# Patient Record
Sex: MALE | Race: WHITE
[De-identification: names, ages, dates, MRNs, and addresses within clinical notes are randomized per-mention and may not be internally consistent; named-entity substitution may affect disease eponyms.]

---

## 2017-08-30 ENCOUNTER — HOSPITAL ENCOUNTER (EMERGENCY)
Dept: HOSPITAL 47 - EC | Age: 32
Discharge: HOME | End: 2017-08-30
Payer: COMMERCIAL

## 2017-08-30 VITALS
TEMPERATURE: 96.9 F | HEART RATE: 66 BPM | DIASTOLIC BLOOD PRESSURE: 69 MMHG | SYSTOLIC BLOOD PRESSURE: 119 MMHG | RESPIRATION RATE: 20 BRPM

## 2017-08-30 DIAGNOSIS — R50.9: ICD-10-CM

## 2017-08-30 DIAGNOSIS — K27.9: Primary | ICD-10-CM

## 2017-08-30 DIAGNOSIS — F17.200: ICD-10-CM

## 2017-08-30 LAB
ALP SERPL-CCNC: 58 U/L (ref 38–126)
ALT SERPL-CCNC: 30 U/L (ref 21–72)
AMYLASE SERPL-CCNC: 59 U/L (ref 30–110)
ANION GAP SERPL CALC-SCNC: 8 MMOL/L
AST SERPL-CCNC: 20 U/L (ref 17–59)
BASOPHILS # BLD AUTO: 0.1 K/UL (ref 0–0.2)
BASOPHILS NFR BLD AUTO: 1 %
BUN SERPL-SCNC: 10 MG/DL (ref 9–20)
CALCIUM SPEC-MCNC: 9.3 MG/DL (ref 8.4–10.2)
CH: 30
CHCM: 32.8
CHLORIDE SERPL-SCNC: 109 MMOL/L (ref 98–107)
CO2 SERPL-SCNC: 26 MMOL/L (ref 22–30)
EOSINOPHIL # BLD AUTO: 0.3 K/UL (ref 0–0.7)
EOSINOPHIL NFR BLD AUTO: 4 %
ERYTHROCYTE [DISTWIDTH] IN BLOOD BY AUTOMATED COUNT: 5.2 M/UL (ref 4.3–5.9)
ERYTHROCYTE [DISTWIDTH] IN BLOOD: 13.1 % (ref 11.5–15.5)
GLUCOSE SERPL-MCNC: 87 MG/DL (ref 74–99)
HCT VFR BLD AUTO: 47.9 % (ref 39–53)
HDW: 2.41
HGB BLD-MCNC: 16.2 GM/DL (ref 13–17.5)
LUC NFR BLD AUTO: 3 %
LYMPHOCYTES # SPEC AUTO: 2.3 K/UL (ref 1–4.8)
LYMPHOCYTES NFR SPEC AUTO: 30 %
MCH RBC QN AUTO: 31.2 PG (ref 25–35)
MCHC RBC AUTO-ENTMCNC: 33.9 G/DL (ref 31–37)
MCV RBC AUTO: 92.1 FL (ref 80–100)
MONOCYTES # BLD AUTO: 0.3 K/UL (ref 0–1)
MONOCYTES NFR BLD AUTO: 4 %
NEUTROPHILS # BLD AUTO: 4.6 K/UL (ref 1.3–7.7)
NEUTROPHILS NFR BLD AUTO: 59 %
NON-AFRICAN AMERICAN GFR(MDRD): >60
PH UR: 7 [PH] (ref 5–8)
POTASSIUM SERPL-SCNC: 4.8 MMOL/L (ref 3.5–5.1)
PROT SERPL-MCNC: 7.2 G/DL (ref 6.3–8.2)
SODIUM SERPL-SCNC: 143 MMOL/L (ref 137–145)
SP GR UR: 1.01 (ref 1–1.03)
UA BILLING (MACRO VS. MICRO): (no result)
UROBILINOGEN UR QL STRIP: <2 MG/DL (ref ?–2)
WBC # BLD AUTO: 0.2 10*3/UL
WBC # BLD AUTO: 7.8 K/UL (ref 3.8–10.6)
WBC (PEROX): 7.94

## 2017-08-30 PROCEDURE — 99284 EMERGENCY DEPT VISIT MOD MDM: CPT

## 2017-08-30 PROCEDURE — 96374 THER/PROPH/DIAG INJ IV PUSH: CPT

## 2017-08-30 PROCEDURE — 80053 COMPREHEN METABOLIC PANEL: CPT

## 2017-08-30 PROCEDURE — 83690 ASSAY OF LIPASE: CPT

## 2017-08-30 PROCEDURE — 82150 ASSAY OF AMYLASE: CPT

## 2017-08-30 PROCEDURE — 83605 ASSAY OF LACTIC ACID: CPT

## 2017-08-30 PROCEDURE — 96375 TX/PRO/DX INJ NEW DRUG ADDON: CPT

## 2017-08-30 PROCEDURE — 86850 RBC ANTIBODY SCREEN: CPT

## 2017-08-30 PROCEDURE — 81003 URINALYSIS AUTO W/O SCOPE: CPT

## 2017-08-30 PROCEDURE — 96361 HYDRATE IV INFUSION ADD-ON: CPT

## 2017-08-30 PROCEDURE — 85025 COMPLETE CBC W/AUTO DIFF WBC: CPT

## 2017-08-30 PROCEDURE — 74177 CT ABD & PELVIS W/CONTRAST: CPT

## 2017-08-30 PROCEDURE — 86901 BLOOD TYPING SEROLOGIC RH(D): CPT

## 2017-08-30 PROCEDURE — 36415 COLL VENOUS BLD VENIPUNCTURE: CPT

## 2017-08-30 PROCEDURE — 86900 BLOOD TYPING SEROLOGIC ABO: CPT

## 2017-08-30 PROCEDURE — 87086 URINE CULTURE/COLONY COUNT: CPT

## 2017-08-30 NOTE — CT
EXAMINATION TYPE: CT abdomen pelvis w con

 

DATE OF EXAM: 8/30/2017

 

COMPARISON: NONE

 

HISTORY: abdominal pain, dark blood in stools X 2 weeks. History of gastritis.

 

CT DLP: 371.2 mGycm, Automated Exposure Control for Dose Reduction was Utilized.

 

CONTRAST: 

CT scan of the abdomen and pelvis is performed without oral but with IV Contrast, patient injected wi
th 100 mL of Omnipaque 300.

 

FINDINGS:

 

LUNG BASES:  No significant abnormality is appreciated.

 

LIVER/GB: In the inferior medial aspect lateral segment left hepatic lobe there is 3.2 x 2.5 cm mass 
on axial image 24 that has hyperdense on initial images and isointense to remainder of liver on delay
ed phased images.

 

PANCREAS:  No significant abnormality is seen.

 

SPLEEN:  No significant abnormality is seen.

 

ADRENALS:  No significant abnormality is seen.

 

KIDNEYS: There is cortical fusion of the lower pole modalities of both kidneys 4 horseshoe type kidne
y appearance. There is subcentimeter simple appearing cyst lower pole level right kidney on image 49 
series 5. Few scattered pelvic phleboliths are seen.

 

BOWEL: Evaluation bowel is suboptimal secondary to lack of enteric contrast. Patient also has very li
ttle abdominal fat making evaluation suboptimal. There is no suspicious dilatation of stomach. Duoden
al sweep is poorly visualized but not dilated. There is no suspicious small or large bowel dilatation
. Appendix is difficult to distinctly visualized but likely within normal limits portions seen best o
n axial image 58 and coronal image 26.

 

PROSTATE/SEMINAL VESICLES:  No gross abnormality seen.

 

LYMPH NODES:  No greater than 1cm abdominal or pelvic lymph nodes are appreciated.

 

OSSEOUS STRUCTURES:  No significant abnormality is seen.

 

OTHER:  No significant additional abnormality is seen.

 

IMPRESSION: 

1. Suboptimal study. No bowel obstruction is seen. No significant finding is seen to account for heather
ent's acute symptoms.

2. Nonspecific 3.2 cm left lobe liver mass in which neoplasm cannot be excluded, suspect FNH or hepat
ic adenoma as most likely etiology. Further investigation with nonemergent multiphasic contrast-enhan
alfie liver protocol MRI is advised.

3. 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

## 2017-08-30 NOTE — ED
General Adult HPI





- General


Chief complaint: Abdominal Pain


Stated complaint: Abd Pain


Time Seen by Provider: 08/30/17 12:38


Source: patient, RN notes reviewed, old records reviewed


Mode of arrival: ambulatory


Limitations: no limitations





- History of Present Illness


Initial comments: 





This is a 32-year-old male to the ER for evaluation.  Patient coming in here 

today fevers of severe abdominal pain.  Periumbilical bowel pain with radiation 

to his left side.  Going on for a month no help with Motrin or Tylenol.  No 

fevers.  He does think there is having some black or dark stools.  No nausea 

and vomiting.  He also states he thinks he sees blood in his stools.  No 

surgical history denies drugs or alcohol





- Related Data


 Previous Rx's











 Medication  Instructions  Recorded


 


Amoxicillin 500 mg PO Q8H #21 capsule 08/30/17


 


Clarithromycin [Biaxin] 500 mg PO Q12HR #14 tablet 08/30/17


 


Famotidine [Pepcid] 20 mg PO BID #30 tablet 08/30/17


 


HYDROcodone/APAP 5-325MG [Norco 1 tab PO Q6HR PRN #20 tab 08/30/17





5-325]  


 


Pantoprazole Sodium [Protonix] 20 mg PO DAILY #30 tablet. 08/30/17











 Allergies











Allergy/AdvReac Type Severity Reaction Status Date / Time


 


No Known Allergies Allergy   Verified 08/30/17 13:38














Review of Systems


ROS Statement: 


Those systems with pertinent positive or pertinent negative responses have been 

documented in the HPI.





ROS Other: All systems not noted in ROS Statement are negative.





Past Medical History


Past Medical History: No Reported History


Additional Past Medical History / Comment(s): Gastritis


History of Any Multi-Drug Resistant Organisms: None Reported


Past Surgical History: No Surgical Hx Reported


Past Psychological History: ADD/ADHD


Smoking Status: Current every day smoker


Past Alcohol Use History: Occasional


Past Drug Use History: Marijuana





General Exam


Limitations: no limitations


General appearance: alert, in no apparent distress


Head exam: Present: atraumatic, normocephalic, normal inspection


Eye exam: Present: normal appearance, PERRL, EOMI.  Absent: scleral icterus, 

conjunctival injection, periorbital swelling


ENT exam: Present: normal exam, mucous membranes moist


Neck exam: Present: normal inspection.  Absent: tenderness, meningismus, 

lymphadenopathy


Respiratory exam: Present: normal lung sounds bilaterally.  Absent: respiratory 

distress, wheezes, rales, rhonchi, stridor


Cardiovascular Exam: Present: regular rate, normal rhythm, normal heart sounds.

  Absent: systolic murmur, diastolic murmur, rubs, gallop, clicks


GI/Abdominal exam: Present: soft, normal bowel sounds.  Absent: distended, 

tenderness, guarding, rebound, rigid


Extremities exam: Present: normal inspection, full ROM, normal capillary 

refill.  Absent: tenderness, pedal edema, joint swelling, calf tenderness


Back exam: Present: normal inspection


Neurological exam: Present: alert, oriented X3, CN II-XII intact


Psychiatric exam: Present: normal affect, normal mood


Skin exam: Present: warm, dry, intact, normal color.  Absent: rash





Course


 Vital Signs











  08/30/17





  12:33


 


Temperature 98.2 F


 


Pulse Rate 78


 


Respiratory 18





Rate 


 


Blood Pressure 141/87


 


O2 Sat by Pulse 100





Oximetry 














- Reevaluation(s)


Reevaluation #1: 





08/30/17 15:03


pain is improved





Medical Decision Making





- Medical Decision Making





30 male to the ER for evaluation of jaw pain, peptic ulcer disease, patient 

likely has also, CT is negative lab work is normal patient can be discharged 

home





- Lab Data


Result diagrams: 


 08/30/17 12:56





 08/30/17 12:56


 Lab Results











  08/30/17 08/30/17 08/30/17 Range/Units





  12:56 12:56 12:56 


 


WBC    7.8  (3.8-10.6)  k/uL


 


RBC    5.20  (4.30-5.90)  m/uL


 


Hgb    16.2  (13.0-17.5)  gm/dL


 


Hct    47.9  (39.0-53.0)  %


 


MCV    92.1  (80.0-100.0)  fL


 


MCH    31.2  (25.0-35.0)  pg


 


MCHC    33.9  (31.0-37.0)  g/dL


 


RDW    13.1  (11.5-15.5)  %


 


Plt Count    279  (150-450)  k/uL


 


Neutrophils %    59  %


 


Lymphocytes %    30  %


 


Monocytes %    4  %


 


Eosinophils %    4  %


 


Basophils %    1  %


 


Neutrophils #    4.6  (1.3-7.7)  k/uL


 


Lymphocytes #    2.3  (1.0-4.8)  k/uL


 


Monocytes #    0.3  (0-1.0)  k/uL


 


Eosinophils #    0.3  (0-0.7)  k/uL


 


Basophils #    0.1  (0-0.2)  k/uL


 


Sodium   143   (137-145)  mmol/L


 


Potassium   4.8   (3.5-5.1)  mmol/L


 


Chloride   109 H   ()  mmol/L


 


Carbon Dioxide   26   (22-30)  mmol/L


 


Anion Gap   8   mmol/L


 


BUN   10   (9-20)  mg/dL


 


Creatinine   0.77   (0.66-1.25)  mg/dL


 


Est GFR (MDRD) Af Amer   >60   (>60 ml/min/1.73 sqM)  


 


Est GFR (MDRD) Non-Af   >60   (>60 ml/min/1.73 sqM)  


 


Glucose   87   (74-99)  mg/dL


 


Plasma Lactic Acid Eduardo     (0.7-2.0)  mmol/L


 


Calcium   9.3   (8.4-10.2)  mg/dL


 


Total Bilirubin   0.4   (0.2-1.3)  mg/dL


 


AST   20   (17-59)  U/L


 


ALT   30   (21-72)  U/L


 


Alkaline Phosphatase   58   ()  U/L


 


Total Protein   7.2   (6.3-8.2)  g/dL


 


Albumin   4.5   (3.5-5.0)  g/dL


 


Amylase   59   ()  U/L


 


Lipase   56   ()  U/L


 


Urine Color     


 


Urine Appearance     (Clear)  


 


Urine pH     (5.0-8.0)  


 


Ur Specific Gravity     (1.001-1.035)  


 


Urine Protein     (Negative)  


 


Urine Glucose (UA)     (Negative)  


 


Urine Ketones     (Negative)  


 


Urine Blood     (Negative)  


 


Urine Nitrite     (Negative)  


 


Urine Bilirubin     (Negative)  


 


Urine Urobilinogen     (<2.0)  mg/dL


 


Ur Leukocyte Esterase     (Negative)  


 


Blood Type  A Positive    


 


Blood Type Recheck  CABO Indicated    


 


Antibody Screen  NEGATIVE    


 


Spec Expiration Date  09/02/2017 - 2356 08/30/17 08/30/17 Range/Units





  12:56 12:56 


 


WBC    (3.8-10.6)  k/uL


 


RBC    (4.30-5.90)  m/uL


 


Hgb    (13.0-17.5)  gm/dL


 


Hct    (39.0-53.0)  %


 


MCV    (80.0-100.0)  fL


 


MCH    (25.0-35.0)  pg


 


MCHC    (31.0-37.0)  g/dL


 


RDW    (11.5-15.5)  %


 


Plt Count    (150-450)  k/uL


 


Neutrophils %    %


 


Lymphocytes %    %


 


Monocytes %    %


 


Eosinophils %    %


 


Basophils %    %


 


Neutrophils #    (1.3-7.7)  k/uL


 


Lymphocytes #    (1.0-4.8)  k/uL


 


Monocytes #    (0-1.0)  k/uL


 


Eosinophils #    (0-0.7)  k/uL


 


Basophils #    (0-0.2)  k/uL


 


Sodium    (137-145)  mmol/L


 


Potassium    (3.5-5.1)  mmol/L


 


Chloride    ()  mmol/L


 


Carbon Dioxide    (22-30)  mmol/L


 


Anion Gap    mmol/L


 


BUN    (9-20)  mg/dL


 


Creatinine    (0.66-1.25)  mg/dL


 


Est GFR (MDRD) Af Amer    (>60 ml/min/1.73 sqM)  


 


Est GFR (MDRD) Non-Af    (>60 ml/min/1.73 sqM)  


 


Glucose    (74-99)  mg/dL


 


Plasma Lactic Acid Eduardo  0.9   (0.7-2.0)  mmol/L


 


Calcium    (8.4-10.2)  mg/dL


 


Total Bilirubin    (0.2-1.3)  mg/dL


 


AST    (17-59)  U/L


 


ALT    (21-72)  U/L


 


Alkaline Phosphatase    ()  U/L


 


Total Protein    (6.3-8.2)  g/dL


 


Albumin    (3.5-5.0)  g/dL


 


Amylase    ()  U/L


 


Lipase    ()  U/L


 


Urine Color   Light Yellow  


 


Urine Appearance   Clear  (Clear)  


 


Urine pH   7.0  (5.0-8.0)  


 


Ur Specific Gravity   1.012  (1.001-1.035)  


 


Urine Protein   Negative  (Negative)  


 


Urine Glucose (UA)   Negative  (Negative)  


 


Urine Ketones   Negative  (Negative)  


 


Urine Blood   Negative  (Negative)  


 


Urine Nitrite   Negative  (Negative)  


 


Urine Bilirubin   Negative  (Negative)  


 


Urine Urobilinogen   <2.0  (<2.0)  mg/dL


 


Ur Leukocyte Esterase   Negative  (Negative)  


 


Blood Type    


 


Blood Type Recheck    


 


Antibody Screen    


 


Spec Expiration Date    














- Radiology Data


Radiology results: report reviewed (CT pelvis is negative for acute disease), 

image reviewed





Disposition


Clinical Impression: 


 Abdominal pain, Peptic ulcer





Disposition: HOME SELF-CARE


Condition: Good


Instructions:  Peptic Ulcer (ED), Gastritis (ED)


Prescriptions: 


Amoxicillin 500 mg PO Q8H #21 capsule


Clarithromycin [Biaxin] 500 mg PO Q12HR #14 tablet


Famotidine [Pepcid] 20 mg PO BID #30 tablet


HYDROcodone/APAP 5-325MG [Norco 5-325] 1 tab PO Q6HR PRN #20 tab


 PRN Reason: Pain


Pantoprazole Sodium [Protonix] 20 mg PO DAILY #30 tablet.


Referrals: 


None,Stated [Primary Care Provider] - 1-2 days

## 2017-09-06 ENCOUNTER — HOSPITAL ENCOUNTER (EMERGENCY)
Dept: HOSPITAL 47 - EC | Age: 32
LOS: 1 days | Discharge: HOME | End: 2017-09-07
Payer: COMMERCIAL

## 2017-09-06 VITALS — RESPIRATION RATE: 16 BRPM

## 2017-09-06 DIAGNOSIS — R10.13: Primary | ICD-10-CM

## 2017-09-06 DIAGNOSIS — F17.200: ICD-10-CM

## 2017-09-06 DIAGNOSIS — R11.2: ICD-10-CM

## 2017-09-06 LAB
ALP SERPL-CCNC: 57 U/L (ref 38–126)
ALT SERPL-CCNC: 33 U/L (ref 21–72)
AMYLASE SERPL-CCNC: 54 U/L (ref 30–110)
ANION GAP SERPL CALC-SCNC: 11 MMOL/L
AST SERPL-CCNC: 25 U/L (ref 17–59)
BASOPHILS # BLD AUTO: 0.1 K/UL (ref 0–0.2)
BASOPHILS NFR BLD AUTO: 1 %
BUN SERPL-SCNC: 17 MG/DL (ref 9–20)
CALCIUM SPEC-MCNC: 10 MG/DL (ref 8.4–10.2)
CH: 31.9
CHCM: 34.6
CHLORIDE SERPL-SCNC: 106 MMOL/L (ref 98–107)
CO2 SERPL-SCNC: 24 MMOL/L (ref 22–30)
EOSINOPHIL # BLD AUTO: 0.3 K/UL (ref 0–0.7)
EOSINOPHIL NFR BLD AUTO: 2 %
ERYTHROCYTE [DISTWIDTH] IN BLOOD BY AUTOMATED COUNT: 5.22 M/UL (ref 4.3–5.9)
ERYTHROCYTE [DISTWIDTH] IN BLOOD: 14 % (ref 11.5–15.5)
GLUCOSE SERPL-MCNC: 94 MG/DL (ref 74–99)
HCT VFR BLD AUTO: 48.2 % (ref 39–53)
HDW: 2.25
HGB BLD-MCNC: 16.1 GM/DL (ref 13–17.5)
LUC NFR BLD AUTO: 2 %
LYMPHOCYTES # SPEC AUTO: 2.2 K/UL (ref 1–4.8)
LYMPHOCYTES NFR SPEC AUTO: 17 %
MCH RBC QN AUTO: 30.9 PG (ref 25–35)
MCHC RBC AUTO-ENTMCNC: 33.5 G/DL (ref 31–37)
MCV RBC AUTO: 92.4 FL (ref 80–100)
MONOCYTES # BLD AUTO: 0.6 K/UL (ref 0–1)
MONOCYTES NFR BLD AUTO: 5 %
NEUTROPHILS # BLD AUTO: 9.8 K/UL (ref 1.3–7.7)
NEUTROPHILS NFR BLD AUTO: 74 %
NON-AFRICAN AMERICAN GFR(MDRD): >60
PARTICLE COUNT: (no result)
PH UR: 6 [PH] (ref 5–8)
POTASSIUM SERPL-SCNC: 4.5 MMOL/L (ref 3.5–5.1)
PROT SERPL-MCNC: 7.7 G/DL (ref 6.3–8.2)
RBC UR QL: 1 /HPF (ref 0–5)
SODIUM SERPL-SCNC: 141 MMOL/L (ref 137–145)
SP GR UR: 1.01 (ref 1–1.03)
SQUAMOUS UR QL AUTO: <1 /HPF (ref 0–4)
UA BILLING (MACRO VS. MICRO): (no result)
UROBILINOGEN UR QL STRIP: <2 MG/DL (ref ?–2)
WBC # BLD AUTO: 0.23 10*3/UL
WBC # BLD AUTO: 13.3 K/UL (ref 3.8–10.6)
WBC #/AREA URNS HPF: 1 /HPF (ref 0–5)
WBC (PEROX): 12.98

## 2017-09-06 PROCEDURE — 80053 COMPREHEN METABOLIC PANEL: CPT

## 2017-09-06 PROCEDURE — 74000: CPT

## 2017-09-06 PROCEDURE — 83690 ASSAY OF LIPASE: CPT

## 2017-09-06 PROCEDURE — 36415 COLL VENOUS BLD VENIPUNCTURE: CPT

## 2017-09-06 PROCEDURE — 81001 URINALYSIS AUTO W/SCOPE: CPT

## 2017-09-06 PROCEDURE — 96361 HYDRATE IV INFUSION ADD-ON: CPT

## 2017-09-06 PROCEDURE — 96374 THER/PROPH/DIAG INJ IV PUSH: CPT

## 2017-09-06 PROCEDURE — 85025 COMPLETE CBC W/AUTO DIFF WBC: CPT

## 2017-09-06 PROCEDURE — 99284 EMERGENCY DEPT VISIT MOD MDM: CPT

## 2017-09-06 PROCEDURE — 96372 THER/PROPH/DIAG INJ SC/IM: CPT

## 2017-09-06 PROCEDURE — 82150 ASSAY OF AMYLASE: CPT

## 2017-09-06 NOTE — ED
Abdominal Pain HPI





- General


Chief Complaint: Abdominal Pain


Stated Complaint: abdominal pain


Time Seen by Provider: 09/06/17 22:09


Source: patient


Mode of arrival: ambulatory


Limitations: no limitations





- History of Present Illness


Initial Comments: 





This patient is a 32-year-old man who presents to be evaluated for epigastric 

abdominal pain.  Pain has been going on around 8-10 days, and he was seen here 

approximately one week ago for the same pains.  He states that he was sent home 

with a number of prescriptions which had helped for a few days but the pain has 

now recurred.  He indicates the epigastric area.  He describes as a burning and 

aching pain.  He has not noted worsening or relieving factors.  The pain is 

severe now.  She is having nausea and he had an episode of vomiting earlier in 

the day without seeing any blood or bile.  He states she has not had any change 

in his bowel movements.  Patient denies any change in urination or any 

discharge.  He states that he did have some right testicular pain earlier but 

is not present now.  Patient had similar pains 2 or 3 years ago and was told 

that this was due to gastritis or early ulcer area


MD Complaint: abdominal pain


Onset/Timing: 10


-: days(s)


Location: epigastric


Migration to: no migration


Severity: severe


Quality: aching


Consistency: constant


Improves With: nothing


Worsens With: nothing


Associated Symptoms: nausea, vomiting





- Related Data


 Previous Rx's











 Medication  Instructions  Recorded


 


Amoxicillin 500 mg PO Q8H #21 capsule 08/30/17


 


Clarithromycin [Biaxin] 500 mg PO Q12HR #14 tablet 08/30/17


 


Famotidine [Pepcid] 20 mg PO BID #30 tablet 08/30/17


 


HYDROcodone/APAP 5-325MG [Norco 1 tab PO Q6HR PRN #20 tab 08/30/17





5-325]  


 


Pantoprazole Sodium [Protonix] 20 mg PO DAILY #30 tablet. 08/30/17


 


Dicyclomine [Bentyl] 20 mg PO QID #15 tablet 09/07/17


 


Famotidine [Pepcid] 20 mg PO DAILY #14 tablet 09/07/17


 


Ondansetron Odt [Zofran ODT] 4 mg PO Q8HR PRN #10 tab 09/07/17











 Allergies











Allergy/AdvReac Type Severity Reaction Status Date / Time


 


No Known Allergies Allergy   Verified 09/06/17 22:19














Review of Systems


ROS Statement: 


Those systems with pertinent positive or pertinent negative responses have been 

documented in the HPI.





ROS Other: All systems not noted in ROS Statement are negative.


Constitutional: Denies: fever, chills


Respiratory: Denies: cough, dyspnea


Cardiovascular: Denies: chest pain, palpitations


Gastrointestinal: Reports: abdominal pain, nausea, vomiting.  Denies: diarrhea, 

melena, hematochezia


Genitourinary: Denies: dysuria, hematuria


Musculoskeletal: Denies: back pain


Skin: Denies: rash


Neurological: Denies: headache





Past Medical History


Past Medical History: No Reported History


Additional Past Medical History / Comment(s): Gastritis


History of Any Multi-Drug Resistant Organisms: None Reported


Past Surgical History: No Surgical Hx Reported


Past Psychological History: ADD/ADHD


Smoking Status: Current every day smoker


Past Alcohol Use History: Occasional


Past Drug Use History: Marijuana





General Exam


Limitations: no limitations


General appearance: alert, in no apparent distress


Head exam: Present: atraumatic, normocephalic


Eye exam: Present: normal appearance.  Absent: scleral icterus, conjunctival 

injection


Respiratory exam: Present: normal lung sounds bilaterally.  Absent: respiratory 

distress, wheezes, rales, rhonchi, stridor


Cardiovascular Exam: Present: regular rate (Heart rate is 96 at my exam), 

normal rhythm, normal heart sounds.  Absent: systolic murmur, diastolic murmur, 

rubs, gallop


GI/Abdominal exam: Present: soft.  Absent: distended, tenderness, guarding, 

rebound, rigid, mass, pulsatile mass, hernia


 exam: Present: normal inspection, vertical testicular lie, circumcision.  

Absent: testicular tenderness, urethral discharge, scrotal swelling


Extremities exam: Present: normal inspection, normal capillary refill.  Absent: 

pedal edema, calf tenderness


Back exam: Present: normal inspection.  Absent: CVA tenderness (R), CVA 

tenderness (L)


Neurological exam: Present: alert


Skin exam: Present: warm, dry, intact, normal color.  Absent: rash





Course


 Vital Signs











  09/06/17 09/06/17 09/07/17





  22:04 22:46 00:53


 


Temperature 97.5 F L  98.1 F


 


Pulse Rate 117 H 82 62


 


Respiratory 18 16 16





Rate   


 


Blood Pressure 118/78  108/59


 


O2 Sat by Pulse 96 97 100





Oximetry   














Medical Decision Making





- Medical Decision Making





Patient's 32-year-old man with abdominal pain.  He has had marked improvement 

with the medications given here and now feels well enough to go home and follow-

up.  We discussed the probable need for having endoscopy.  Discussed other 

possible etiologies. We discussed return parameters and he will return should 

the pain recur or any new symptoms develop.





- Lab Data


Result diagrams: 


 09/06/17 22:44





 09/06/17 22:44


 Lab Results











  09/06/17 09/06/17 09/06/17 Range/Units





  22:44 22:44 22:44 


 


WBC   13.3 H   (3.8-10.6)  k/uL


 


RBC   5.22   (4.30-5.90)  m/uL


 


Hgb   16.1   (13.0-17.5)  gm/dL


 


Hct   48.2   (39.0-53.0)  %


 


MCV   92.4   (80.0-100.0)  fL


 


MCH   30.9   (25.0-35.0)  pg


 


MCHC   33.5   (31.0-37.0)  g/dL


 


RDW   14.0   (11.5-15.5)  %


 


Plt Count   273   (150-450)  k/uL


 


Neutrophils %   74   %


 


Lymphocytes %   17   %


 


Monocytes %   5   %


 


Eosinophils %   2   %


 


Basophils %   1   %


 


Neutrophils #   9.8 H   (1.3-7.7)  k/uL


 


Lymphocytes #   2.2   (1.0-4.8)  k/uL


 


Monocytes #   0.6   (0-1.0)  k/uL


 


Eosinophils #   0.3   (0-0.7)  k/uL


 


Basophils #   0.1   (0-0.2)  k/uL


 


Sodium  141    (137-145)  mmol/L


 


Potassium  4.5    (3.5-5.1)  mmol/L


 


Chloride  106    ()  mmol/L


 


Carbon Dioxide  24    (22-30)  mmol/L


 


Anion Gap  11    mmol/L


 


BUN  17    (9-20)  mg/dL


 


Creatinine  0.93    (0.66-1.25)  mg/dL


 


Est GFR (MDRD) Af Amer  >60    (>60 ml/min/1.73 sqM)  


 


Est GFR (MDRD) Non-Af  >60    (>60 ml/min/1.73 sqM)  


 


Glucose  94    (74-99)  mg/dL


 


Calcium  10.0    (8.4-10.2)  mg/dL


 


Total Bilirubin  0.5    (0.2-1.3)  mg/dL


 


AST  25    (17-59)  U/L


 


ALT  33    (21-72)  U/L


 


Alkaline Phosphatase  57    ()  U/L


 


Total Protein  7.7    (6.3-8.2)  g/dL


 


Albumin  4.9    (3.5-5.0)  g/dL


 


Amylase  54    ()  U/L


 


Lipase  99    ()  U/L


 


Urine Color    Yellow  


 


Urine Appearance    Cloudy  (Clear)  


 


Urine pH    6.0  (5.0-8.0)  


 


Ur Specific Gravity    1.013  (1.001-1.035)  


 


Urine Protein    Negative  (Negative)  


 


Urine Glucose (UA)    Negative  (Negative)  


 


Urine Ketones    Negative  (Negative)  


 


Urine Blood    Negative  (Negative)  


 


Urine Nitrite    Negative  (Negative)  


 


Urine Bilirubin    Negative  (Negative)  


 


Urine Urobilinogen    <2.0  (<2.0)  mg/dL


 


Ur Leukocyte Esterase    Negative  (Negative)  


 


Urine RBC    1  (0-5)  /hpf


 


Urine WBC    1  (0-5)  /hpf


 


Ur Squamous Epith Cells    <1  (0-4)  /hpf


 


Amorphous Sediment    Rare H  (None)  /hpf


 


Urine Mucus    Rare H  (None)  /hpf














Disposition


Clinical Impression: 


 Abdominal pain





Disposition: HOME SELF-CARE


Condition: Fair


Instructions:  Abdominal Pain (ED)


Prescriptions: 


Dicyclomine [Bentyl] 20 mg PO QID #15 tablet


Famotidine [Pepcid] 20 mg PO DAILY #14 tablet


Ondansetron Odt [Zofran ODT] 4 mg PO Q8HR PRN #10 tab


 PRN Reason: Nausea


Referrals: 


None,Stated [Primary Care Provider] - 1-2 days


Saba Hui MD [STAFF PHYSICIAN] - 1-2 days

## 2017-09-06 NOTE — XR
EXAM:

  XR Abdomen, 1 View

 

CLINICAL HISTORY:

  Reason: abdominal pain

 

TECHNIQUE:

  Frontal upright view of the abdomen/pelvis.

 

COMPARISON:

  No relevant prior studies available.

 

FINDINGS:

  Lower thorax:  Mild elevation of the left hemidiaphragm.

  Intraperitoneal space:  Bowel gas pattern is unremarkable.  No evidence 

of bowel obstruction or pneumoperitoneum.

  Gastrointestinal tract:  Unremarkable.  No dilation.

  Organs:  No radiopaque renal calculi.  No abnormal calcifications in 

the abdomen or pelvis.  Small calcified pelvic phleboliths in the lower 

pelvis.

  Bones/joints:  No acute bony abnormalities identified.

 

IMPRESSION:     

  No evidence of bowel obstruction or pneumoperitoneum.

 

  Mild elevation of left hemidiaphragm.

## 2017-09-07 VITALS — TEMPERATURE: 98.1 F | SYSTOLIC BLOOD PRESSURE: 108 MMHG | HEART RATE: 62 BPM | DIASTOLIC BLOOD PRESSURE: 59 MMHG

## 2018-06-14 ENCOUNTER — HOSPITAL ENCOUNTER (EMERGENCY)
Dept: HOSPITAL 47 - EC | Age: 33
Discharge: HOME | End: 2018-06-14
Payer: SELF-PAY

## 2018-06-14 VITALS — DIASTOLIC BLOOD PRESSURE: 77 MMHG | SYSTOLIC BLOOD PRESSURE: 131 MMHG | TEMPERATURE: 98.2 F | HEART RATE: 78 BPM

## 2018-06-14 VITALS — RESPIRATION RATE: 18 BRPM

## 2018-06-14 DIAGNOSIS — F17.200: ICD-10-CM

## 2018-06-14 DIAGNOSIS — M62.838: ICD-10-CM

## 2018-06-14 DIAGNOSIS — M19.011: Primary | ICD-10-CM

## 2018-06-14 DIAGNOSIS — M75.101: ICD-10-CM

## 2018-06-14 PROCEDURE — 96372 THER/PROPH/DIAG INJ SC/IM: CPT

## 2018-06-14 PROCEDURE — 99283 EMERGENCY DEPT VISIT LOW MDM: CPT

## 2018-06-14 PROCEDURE — 73030 X-RAY EXAM OF SHOULDER: CPT

## 2018-06-14 NOTE — ED
Upper Extremity HPI





- General


Chief Complaint: Extremity Injury, Upper


Stated Complaint: Arm/Shoulder Pain


Time Seen by Provider: 06/14/18 08:11


Source: patient, RN notes reviewed


Mode of arrival: ambulatory


Limitations: no limitations





- History of Present Illness


Initial Comments: 





This is a 33-year-old male presents emergency Department chief complaint right 

shoulder pain.  Patient states that he had some pain yesterday that worsened 

overnight.  Patient states it is a dull pain but worsens with sharp pain with 

movement.  Patient states it feels deep inside his shoulder.  Patient denies 

any paresthesias of his right arm denies any discoloration or change warmth.  

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 

usual.  Patient has no neck pain no headache no dizziness.  Denies any chest 

pain or shortness of breath.





- Related Data


 Previous Rx's











 Medication  Instructions  Recorded


 


Cyclobenzaprine [Flexeril] 10 mg PO TID PRN #15 tab 06/14/18


 


Ibuprofen [Motrin] 600 mg PO Q8HR PRN #30 tab 06/14/18











 Allergies











Allergy/AdvReac Type Severity Reaction Status Date / Time


 


No Known Allergies Allergy   Verified 06/14/18 07:54














Review of Systems


ROS Statement: 


Those systems with pertinent positive or pertinent negative responses have been 

documented in the HPI.





ROS Other: All systems not noted in ROS Statement are negative.





Past Medical History


Past Medical History: Asthma


Additional Past Medical History / Comment(s): Gastritis


History of Any Multi-Drug Resistant Organisms: None Reported


Past Surgical History: No Surgical Hx Reported


Past Psychological History: ADD/ADHD


Smoking Status: Current every day smoker


Past Alcohol Use History: Occasional


Past Drug Use History: Marijuana





General Exam


Limitations: no limitations


General appearance: alert, in no apparent distress


Head exam: Present: atraumatic, normocephalic, normal inspection


Neck exam: Present: normal inspection, full ROM.  Absent: tenderness, 

meningismus, lymphadenopathy


Respiratory exam: Present: normal lung sounds bilaterally.  Absent: respiratory 

distress, wheezes, rales, rhonchi, stridor


Cardiovascular Exam: Present: regular rate, normal rhythm, normal heart sounds.

  Absent: systolic murmur, diastolic murmur, rubs, gallop, clicks


Extremities exam: Present: other (Right trapezius of his muscle spasm and 

tenderness with palpation, diffuse pain with passive and active range of motion 

right arm.  Neurovascular intact radial pulses equal bilaterally full range of 

motion at right wrist right elbow)


Back exam: Present: normal inspection, full ROM.  Absent: tenderness


Neurological exam: Present: reflexes normal.  Absent: motor sensory deficit


Skin exam: Present: warm, dry, intact, normal color.  Absent: rash





Course


 Vital Signs











  06/14/18





  07:38


 


Temperature 97.5 F L


 


Pulse Rate 89


 


Respiratory 18





Rate 


 


Blood Pressure 151/102


 


O2 Sat by Pulse 98





Oximetry 














Medical Decision Making





- Medical Decision Making





33-year-old male presents  for right shoulder pain.  Patient's symptoms are 

consistent with tendinitis post rotator cuff patient does have evidence of 

chronic before meals joint separation with  arthritis.  Patient will be 

discharged with anti-inflammatories and muscle relaxers for history wheezes 

muscle spasm who placed a sling for 24 hours for comfort and advised to follow-

up outpatient.





Disposition


Clinical Impression: 


 Osteoarthritis of AC (acromioclavicular) joint, Rotator cuff tendinitis, 

Trapezius muscle spasm





Disposition: HOME SELF-CARE


Condition: Stable


Instructions:  Rotator Cuff Tendinitis (ED)


Additional Instructions: 


Please return to the Emergency Department if symptoms worsen or any other 

concerns.


Prescriptions: 


Cyclobenzaprine [Flexeril] 10 mg PO TID PRN #15 tab


 PRN Reason: Muscle Spasm


Ibuprofen [Motrin] 600 mg PO Q8HR PRN #30 tab


 PRN Reason: Pain


Is patient prescribed a controlled substance at d/c from ED?: No


Referrals: 


None,Stated [Primary Care Provider] - 1-2 days


Agapito Holliday DO [Doctor of Osteopathic Medicine] - 1-2 days


Time of Disposition: 08:47

## 2018-06-14 NOTE — XR
Right shoulder

 

HISTORY: Pain, low limited range of motion

 

3 views of the right shoulder correlated to prior exam 6/19/2014

 

The distal clavicle is slightly superiorly displaced in relation to the acromion, findings are simila
r to prior exam. There is some associated calcification, arthropathy change at the acromioclavicular 
joint. Bone mineralization and joint spaces are otherwise maintained.

 

IMPRESSION: Findings suggest chronic acromioclavicular separation, secondary osteoarthritic change, n
o acute abnormality evident.

## 2019-03-28 ENCOUNTER — HOSPITAL ENCOUNTER (EMERGENCY)
Dept: HOSPITAL 47 - EC | Age: 34
Discharge: HOME | End: 2019-03-28
Payer: COMMERCIAL

## 2019-03-28 VITALS
SYSTOLIC BLOOD PRESSURE: 135 MMHG | TEMPERATURE: 98.4 F | DIASTOLIC BLOOD PRESSURE: 88 MMHG | HEART RATE: 99 BPM | RESPIRATION RATE: 20 BRPM

## 2019-03-28 DIAGNOSIS — K29.70: Primary | ICD-10-CM

## 2019-03-28 DIAGNOSIS — Q63.1: ICD-10-CM

## 2019-03-28 DIAGNOSIS — F17.200: ICD-10-CM

## 2019-03-28 DIAGNOSIS — Z87.19: ICD-10-CM

## 2019-03-28 LAB
ALBUMIN SERPL-MCNC: 4.1 G/DL (ref 3.5–5)
ALP SERPL-CCNC: 61 U/L (ref 38–126)
ALT SERPL-CCNC: 29 U/L (ref 21–72)
ANION GAP SERPL CALC-SCNC: 7 MMOL/L
AST SERPL-CCNC: 21 U/L (ref 17–59)
BASOPHILS # BLD AUTO: 0.1 K/UL (ref 0–0.2)
BASOPHILS NFR BLD AUTO: 1 %
BUN SERPL-SCNC: 14 MG/DL (ref 9–20)
CALCIUM SPEC-MCNC: 9.9 MG/DL (ref 8.4–10.2)
CHLORIDE SERPL-SCNC: 105 MMOL/L (ref 98–107)
CO2 SERPL-SCNC: 28 MMOL/L (ref 22–30)
EOSINOPHIL # BLD AUTO: 0.5 K/UL (ref 0–0.7)
EOSINOPHIL NFR BLD AUTO: 5 %
ERYTHROCYTE [DISTWIDTH] IN BLOOD BY AUTOMATED COUNT: 4.8 M/UL (ref 4.3–5.9)
ERYTHROCYTE [DISTWIDTH] IN BLOOD: 12.9 % (ref 11.5–15.5)
GLUCOSE SERPL-MCNC: 87 MG/DL (ref 74–99)
HCT VFR BLD AUTO: 43.7 % (ref 39–53)
HGB BLD-MCNC: 14.3 GM/DL (ref 13–17.5)
LIPASE SERPL-CCNC: 88 U/L (ref 23–300)
LYMPHOCYTES # SPEC AUTO: 2.3 K/UL (ref 1–4.8)
LYMPHOCYTES NFR SPEC AUTO: 24 %
MCH RBC QN AUTO: 29.7 PG (ref 25–35)
MCHC RBC AUTO-ENTMCNC: 32.6 G/DL (ref 31–37)
MCV RBC AUTO: 91.1 FL (ref 80–100)
MONOCYTES # BLD AUTO: 0.6 K/UL (ref 0–1)
MONOCYTES NFR BLD AUTO: 7 %
NEUTROPHILS # BLD AUTO: 5.9 K/UL (ref 1.3–7.7)
NEUTROPHILS NFR BLD AUTO: 62 %
PH UR: 6 [PH] (ref 5–8)
PLATELET # BLD AUTO: 263 K/UL (ref 150–450)
POTASSIUM SERPL-SCNC: 4.8 MMOL/L (ref 3.5–5.1)
PROT SERPL-MCNC: 6.7 G/DL (ref 6.3–8.2)
SODIUM SERPL-SCNC: 140 MMOL/L (ref 137–145)
SP GR UR: 1.01 (ref 1–1.03)
UROBILINOGEN UR QL STRIP: <2 MG/DL (ref ?–2)
WBC # BLD AUTO: 9.6 K/UL (ref 3.8–10.6)

## 2019-03-28 PROCEDURE — 85025 COMPLETE CBC W/AUTO DIFF WBC: CPT

## 2019-03-28 PROCEDURE — 74177 CT ABD & PELVIS W/CONTRAST: CPT

## 2019-03-28 PROCEDURE — 96374 THER/PROPH/DIAG INJ IV PUSH: CPT

## 2019-03-28 PROCEDURE — 96361 HYDRATE IV INFUSION ADD-ON: CPT

## 2019-03-28 PROCEDURE — 36415 COLL VENOUS BLD VENIPUNCTURE: CPT

## 2019-03-28 PROCEDURE — 83690 ASSAY OF LIPASE: CPT

## 2019-03-28 PROCEDURE — 81003 URINALYSIS AUTO W/O SCOPE: CPT

## 2019-03-28 PROCEDURE — 99284 EMERGENCY DEPT VISIT MOD MDM: CPT

## 2019-03-28 PROCEDURE — 96375 TX/PRO/DX INJ NEW DRUG ADDON: CPT

## 2019-03-28 PROCEDURE — 80053 COMPREHEN METABOLIC PANEL: CPT

## 2019-03-28 RX ADMIN — KETOROLAC TROMETHAMINE STA: 30 INJECTION, SOLUTION INTRAMUSCULAR at 21:18

## 2019-03-28 RX ADMIN — KETOROLAC TROMETHAMINE STA MG: 30 INJECTION, SOLUTION INTRAMUSCULAR at 21:06

## 2019-03-28 NOTE — ED
Abdominal Pain HPI





- General


Chief Complaint: Abdominal Pain


Stated Complaint: Abd pain


Time Seen by Provider: 03/28/19 20:37


Source: patient


Mode of arrival: ambulatory


Limitations: no limitations





- History of Present Illness


Initial Comments: 


Patient presents with a chief complaint of epigastric pain starting this 

morning.  He has a history of GERD.  Patient characterizes his pain as a sharp 

and radiates to the left flank and back.  He's never had any previous incidences

that were similar.  He cannot identify an inciting incident.  Aggravating fact

ors include position changes, and movement.  There are no alleviating factors.  

Timing is constant.








- Related Data


                                  Previous Rx's











 Medication  Instructions  Recorded


 


Ranitidine HCl [Zantac] 150 mg PO BID #30 tab 03/28/19


 


Sucralfate [Carafate] 1 gm PO ACHS #30 tablet 03/28/19











                                    Allergies











Allergy/AdvReac Type Severity Reaction Status Date / Time


 


No Known Allergies Allergy   Verified 03/28/19 20:44














Review of Systems


ROS Statement: 


Those systems with pertinent positive or pertinent negative responses have been 

documented in the HPI.





ROS Other: All systems not noted in ROS Statement are negative.


Gastrointestinal: Reports: abdominal pain, nausea





Past Medical History


Past Medical History: Asthma


Additional Past Medical History / Comment(s): Gastritis


History of Any Multi-Drug Resistant Organisms: None Reported


Past Surgical History: No Surgical Hx Reported


Past Psychological History: ADD/ADHD


Smoking Status: Current every day smoker


Past Alcohol Use History: Occasional


Past Drug Use History: Marijuana





General Exam


Limitations: no limitations


General appearance: alert, in no apparent distress


Head exam: Present: atraumatic, normocephalic


Eye exam: Present: normal appearance


ENT exam: Present: normal exam


Neck exam: Present: normal inspection


Respiratory exam: Present: normal lung sounds bilaterally.  Absent: respiratory 

distress, wheezes


Cardiovascular Exam: Present: regular rate, normal rhythm


GI/Abdominal exam: Present: soft, tenderness (Tenderness in the epigastric 

area).  Absent: distended


Rectal exam: Present: deferred


Extremities exam: Present: normal inspection


Back exam: Present: normal inspection


Neurological exam: Present: alert, oriented X3


Psychiatric exam: Present: normal affect, normal mood


Skin exam: Present: warm, dry, intact





Course


                                   Vital Signs











  03/28/19





  20:16


 


Temperature 98.4 F


 


Pulse Rate 99


 


Respiratory 20





Rate 


 


Blood Pressure 135/88


 


O2 Sat by Pulse 96





Oximetry 














Medical Decision Making





- Medical Decision Making


Patient presents with a chief complaint of epigastric abdominal pain.  On 

initial evaluation, vitals are stable, patient is in mild distress secondary to 

pain.  He will be evaluated basic labs including liver profile and lipase.  

He'll be sent for a computed tomography scan of the abdomen and pelvis with IV 

contrast.  He was given morphine, Toradol, IV fluids, Zofran, and a GI cocktail.





11:23 PM


Evaluation the patient is unremarkable.  Liver enzymes and lipase are within 

normal limits.  Urinalysis shows no evidence of infection.  Computed tomography 

scan of the abdomen and pelvis with contrast did not show any acute pathology.  

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 

right side.  There is no obstructive uropathy.  On reevaluation, the patient 

states that he is feeling improved.  At this time, the patient likely suffering 

from gastritis versus peptic ulcer disease.  Patient states that he uses caf

feine daily, nicotine daily, and has a history of GERD.  This time, patient will

 be discharged with prescriptions for Zantac twice a day, and Carafate.  Patient

 was instructed to follow up with primary care for which she will be referred, 

in one to 2 days, return to the ED if symptoms worsen or change.








- Lab Data


Result diagrams: 


                                 03/28/19 21:10





                                 03/28/19 21:10


                                   Lab Results











  03/28/19 03/28/19 03/28/19 Range/Units





  21:10 21:10 21:10 


 


WBC  9.6    (3.8-10.6)  k/uL


 


RBC  4.80    (4.30-5.90)  m/uL


 


Hgb  14.3    (13.0-17.5)  gm/dL


 


Hct  43.7    (39.0-53.0)  %


 


MCV  91.1    (80.0-100.0)  fL


 


MCH  29.7    (25.0-35.0)  pg


 


MCHC  32.6    (31.0-37.0)  g/dL


 


RDW  12.9    (11.5-15.5)  %


 


Plt Count  263    (150-450)  k/uL


 


Neutrophils %  62    %


 


Lymphocytes %  24    %


 


Monocytes %  7    %


 


Eosinophils %  5    %


 


Basophils %  1    %


 


Neutrophils #  5.9    (1.3-7.7)  k/uL


 


Lymphocytes #  2.3    (1.0-4.8)  k/uL


 


Monocytes #  0.6    (0-1.0)  k/uL


 


Eosinophils #  0.5    (0-0.7)  k/uL


 


Basophils #  0.1    (0-0.2)  k/uL


 


Sodium   140   (137-145)  mmol/L


 


Potassium   4.8   (3.5-5.1)  mmol/L


 


Chloride   105   ()  mmol/L


 


Carbon Dioxide   28   (22-30)  mmol/L


 


Anion Gap   7   mmol/L


 


BUN   14   (9-20)  mg/dL


 


Creatinine   0.80   (0.66-1.25)  mg/dL


 


Est GFR (CKD-EPI)AfAm   >90   (>60 ml/min/1.73 sqM)  


 


Est GFR (CKD-EPI)NonAf   >90   (>60 ml/min/1.73 sqM)  


 


Glucose   87   (74-99)  mg/dL


 


Calcium   9.9   (8.4-10.2)  mg/dL


 


Total Bilirubin   0.2   (0.2-1.3)  mg/dL


 


AST   21   (17-59)  U/L


 


ALT   29   (21-72)  U/L


 


Alkaline Phosphatase   61   ()  U/L


 


Total Protein   6.7   (6.3-8.2)  g/dL


 


Albumin   4.1   (3.5-5.0)  g/dL


 


Lipase   88   ()  U/L


 


Urine Color    Light Yellow  


 


Urine Appearance    Clear  (Clear)  


 


Urine pH    6.0  (5.0-8.0)  


 


Ur Specific Gravity    1.012  (1.001-1.035)  


 


Urine Protein    Negative  (Negative)  


 


Urine Glucose (UA)    Negative  (Negative)  


 


Urine Ketones    Negative  (Negative)  


 


Urine Blood    Negative  (Negative)  


 


Urine Nitrite    Negative  (Negative)  


 


Urine Bilirubin    Negative  (Negative)  


 


Urine Urobilinogen    <2.0  (<2.0)  mg/dL


 


Ur Leukocyte Esterase    Negative  (Negative)  














Disposition


Clinical Impression: 


 Gastritis, Abdominal pain





Disposition: HOME SELF-CARE


Instructions (If sedation given, give patient instructions):  Abdominal Pain 

(ED)


Is patient prescribed a controlled substance at d/c from ED?: No


Referrals: 


None,Stated [Primary Care Provider] - 1-2 days


Nieves Ingram MD [STAFF PHYSICIAN] - 1-2 days

## 2019-03-29 NOTE — CT
EXAMINATION TYPE: CT abdomen pelvis w con

 

DATE OF EXAM: 3/29/2019

 

COMPARISON: CT abdomen pelvis August 30, 2017

 

HISTORY: Abdominal pain not further specified.

 

CT DLP: 569.7 mGycm, Automated Exposure Control for Dose Reduction was Utilized.

 

CONTRAST: 

CT scan of the abdomen and pelvis is performed without oral but with with IV Contrast, patient inject
ed with 100 ml mL of Isovue 300.

 

FINDINGS: Patient has virtually no intra-abdominal fat making evaluation suboptimal

 

LUNG BASES:  No significant abnormality is appreciated.

 

LIVER/GB: There is persistent 2.6 cm hyperdense lesion left hepatic lobe lateral segment axial image 
31 that becomes isodense on delayed phase images consistent with FNH or hepatic adenoma.

 

PANCREAS:  No significant abnormality is seen.

 

SPLEEN:  No significant abnormality is seen.

 

ADRENALS:  No significant abnormality is seen.

 

KIDNEYS: Horseshoe type kidney redemonstrated. There is symmetric cortical medullary uptake and excre
tion without hydronephrosis seen bilaterally. Subcentimeter lesion medially lower pole right kidney a
xial image 55 is too small to further characterize but presumed benign.

 

BOWEL: Evaluation bowel suboptimal due to lack of enteric contrast and patient having virtually no in
tra-abdominal fat. No suspicious small or large bowel dilatation is seen.

 

 PROSTATE/SEMINAL VESICLES: Scattered pelvic phleboliths are present bilaterally. 

 

LYMPH NODES:  No greater than 1cm abdominal or pelvic lymph nodes are appreciated.

 

OSSEOUS STRUCTURES: Dextroconvex scoliotic curvature or positioning is noted. Prominent posterior dis
c herniation L4-L5 level is redemonstrated effacing anterior thecal sac sagittal image 53.

 

OTHER:  No significant additional abnormality is seen.

 

IMPRESSION: 

1.  No significant acute finding is seen to account for patient's clinical symptoms. 

2. Stable 2.6 cm hyperdense left hepatic lobe lesion suspicious for FNH or hepatic adenoma. Lesion co
uld be possibly better evaluated and characterized with liver protocol contrast-enhanced MRI if radha
ed.

 

Preliminary report for the study was provided by [facility].

## 2020-01-22 ENCOUNTER — HOSPITAL ENCOUNTER (OUTPATIENT)
Dept: HOSPITAL 47 - LABWHC1 | Age: 35
Discharge: HOME | End: 2020-01-22
Attending: INTERNAL MEDICINE
Payer: COMMERCIAL

## 2020-01-22 DIAGNOSIS — J44.9: ICD-10-CM

## 2020-01-22 DIAGNOSIS — E55.9: ICD-10-CM

## 2020-01-22 DIAGNOSIS — F32.9: ICD-10-CM

## 2020-01-22 DIAGNOSIS — E78.5: ICD-10-CM

## 2020-01-22 DIAGNOSIS — R05: ICD-10-CM

## 2020-01-22 DIAGNOSIS — F17.200: ICD-10-CM

## 2020-01-22 DIAGNOSIS — F90.9: ICD-10-CM

## 2020-01-22 DIAGNOSIS — D64.9: Primary | ICD-10-CM

## 2020-01-22 LAB
ALBUMIN SERPL-MCNC: 4.5 G/DL (ref 3.8–4.9)
ALBUMIN/GLOB SERPL: 2.65 G/DL (ref 1.6–3.17)
ALP SERPL-CCNC: 81 U/L (ref 41–126)
ALT SERPL-CCNC: 15 U/L (ref 10–49)
ANION GAP SERPL CALC-SCNC: 4.1 MMOL/L (ref 4–12)
AST SERPL-CCNC: 19 U/L (ref 14–35)
BASOPHILS # BLD AUTO: 0.1 K/UL (ref 0–0.2)
BASOPHILS NFR BLD AUTO: 1 %
BUN SERPL-SCNC: 13 MG/DL (ref 9–27)
BUN/CREAT SERPL: 16.25 RATIO (ref 12–20)
CALCIUM SPEC-MCNC: 9.2 MG/DL (ref 8.7–10.3)
CHLORIDE SERPL-SCNC: 103 MMOL/L (ref 96–109)
CHOLEST SERPL-MCNC: 130 MG/DL (ref 0–200)
CK SERPL-CCNC: 146 U/L (ref 35–257)
CO2 SERPL-SCNC: 28.9 MMOL/L (ref 21.6–31.8)
EOSINOPHIL # BLD AUTO: 0.3 K/UL (ref 0–0.7)
EOSINOPHIL NFR BLD AUTO: 3 %
ERYTHROCYTE [DISTWIDTH] IN BLOOD BY AUTOMATED COUNT: 4.77 M/UL (ref 4.3–5.9)
ERYTHROCYTE [DISTWIDTH] IN BLOOD: 12.5 % (ref 11.5–15.5)
GLOBULIN SER CALC-MCNC: 1.7 G/DL (ref 1.6–3.3)
GLUCOSE SERPL-MCNC: 95 MG/DL (ref 70–110)
HCT VFR BLD AUTO: 45 % (ref 39–53)
HDLC SERPL-MCNC: 33 MG/DL (ref 40–60)
HGB BLD-MCNC: 14.7 GM/DL (ref 13–17.5)
LDLC SERPL CALC-MCNC: 75.8 MG/DL (ref 0–131)
LYMPHOCYTES # SPEC AUTO: 1.7 K/UL (ref 1–4.8)
LYMPHOCYTES NFR SPEC AUTO: 14 %
MCH RBC QN AUTO: 30.8 PG (ref 25–35)
MCHC RBC AUTO-ENTMCNC: 32.7 G/DL (ref 31–37)
MCV RBC AUTO: 94.3 FL (ref 80–100)
MONOCYTES # BLD AUTO: 0.7 K/UL (ref 0–1)
MONOCYTES NFR BLD AUTO: 6 %
NEUTROPHILS # BLD AUTO: 9.1 K/UL (ref 1.3–7.7)
NEUTROPHILS NFR BLD AUTO: 75 %
PLATELET # BLD AUTO: 250 K/UL (ref 150–450)
POTASSIUM SERPL-SCNC: 4.2 MMOL/L (ref 3.5–5.5)
PROT SERPL-MCNC: 6.2 G/DL (ref 6.2–8.2)
SODIUM SERPL-SCNC: 136 MMOL/L (ref 135–145)
TRIGL SERPL-MCNC: 106 MG/DL (ref 0–149)
VLDLC SERPL CALC-MCNC: 21.2 MG/DL (ref 5–40)
WBC # BLD AUTO: 12.2 K/UL (ref 3.8–10.6)

## 2020-01-22 PROCEDURE — 71046 X-RAY EXAM CHEST 2 VIEWS: CPT

## 2020-01-22 PROCEDURE — 80074 ACUTE HEPATITIS PANEL: CPT

## 2020-01-22 PROCEDURE — 85652 RBC SED RATE AUTOMATED: CPT

## 2020-01-22 PROCEDURE — 82306 VITAMIN D 25 HYDROXY: CPT

## 2020-01-22 PROCEDURE — 80053 COMPREHEN METABOLIC PANEL: CPT

## 2020-01-22 PROCEDURE — 82550 ASSAY OF CK (CPK): CPT

## 2020-01-22 PROCEDURE — 84443 ASSAY THYROID STIM HORMONE: CPT

## 2020-01-22 PROCEDURE — 85025 COMPLETE CBC W/AUTO DIFF WBC: CPT

## 2020-01-22 PROCEDURE — 80061 LIPID PANEL: CPT

## 2020-01-22 PROCEDURE — 36415 COLL VENOUS BLD VENIPUNCTURE: CPT

## 2022-08-01 ENCOUNTER — HOSPITAL ENCOUNTER (EMERGENCY)
Dept: HOSPITAL 47 - EC | Age: 37
Discharge: HOME | End: 2022-08-01
Payer: COMMERCIAL

## 2022-08-01 VITALS — DIASTOLIC BLOOD PRESSURE: 94 MMHG | TEMPERATURE: 97.9 F | SYSTOLIC BLOOD PRESSURE: 136 MMHG | RESPIRATION RATE: 20 BRPM

## 2022-08-01 VITALS — HEART RATE: 78 BPM

## 2022-08-01 DIAGNOSIS — T65.91XA: Primary | ICD-10-CM

## 2022-08-01 DIAGNOSIS — J45.909: ICD-10-CM

## 2022-08-01 LAB
ALBUMIN SERPL-MCNC: 4.3 G/DL (ref 3.5–5)
ALP SERPL-CCNC: 76 U/L (ref 38–126)
ALT SERPL-CCNC: 20 U/L (ref 4–49)
ANION GAP SERPL CALC-SCNC: 7 MMOL/L
AST SERPL-CCNC: 24 U/L (ref 17–59)
BASOPHILS # BLD AUTO: 0.1 K/UL (ref 0–0.2)
BASOPHILS NFR BLD AUTO: 1 %
BUN SERPL-SCNC: 11 MG/DL (ref 9–20)
CALCIUM SPEC-MCNC: 9.1 MG/DL (ref 8.4–10.2)
CHLORIDE SERPL-SCNC: 106 MMOL/L (ref 98–107)
CK SERPL-CCNC: 153 U/L (ref 55–170)
CO2 SERPL-SCNC: 24 MMOL/L (ref 22–30)
EOSINOPHIL # BLD AUTO: 0.3 K/UL (ref 0–0.7)
EOSINOPHIL NFR BLD AUTO: 4 %
ERYTHROCYTE [DISTWIDTH] IN BLOOD BY AUTOMATED COUNT: 4.73 M/UL (ref 4.3–5.9)
ERYTHROCYTE [DISTWIDTH] IN BLOOD: 13.5 % (ref 11.5–15.5)
GLUCOSE SERPL-MCNC: 165 MG/DL (ref 74–99)
HCT VFR BLD AUTO: 44.8 % (ref 39–53)
HGB BLD-MCNC: 14.9 GM/DL (ref 13–17.5)
LYMPHOCYTES # SPEC AUTO: 3.1 K/UL (ref 1–4.8)
LYMPHOCYTES NFR SPEC AUTO: 40 %
MAGNESIUM SPEC-SCNC: 2 MG/DL (ref 1.6–2.3)
MCH RBC QN AUTO: 31.6 PG (ref 25–35)
MCHC RBC AUTO-ENTMCNC: 33.3 G/DL (ref 31–37)
MCV RBC AUTO: 94.8 FL (ref 80–100)
MONOCYTES # BLD AUTO: 0.3 K/UL (ref 0–1)
MONOCYTES NFR BLD AUTO: 4 %
NEUTROPHILS # BLD AUTO: 3.6 K/UL (ref 1.3–7.7)
NEUTROPHILS NFR BLD AUTO: 48 %
PLATELET # BLD AUTO: 282 K/UL (ref 150–450)
POTASSIUM SERPL-SCNC: 3.7 MMOL/L (ref 3.5–5.1)
PROT SERPL-MCNC: 6.6 G/DL (ref 6.3–8.2)
SODIUM SERPL-SCNC: 137 MMOL/L (ref 137–145)
WBC # BLD AUTO: 7.7 K/UL (ref 3.8–10.6)

## 2022-08-01 PROCEDURE — 96361 HYDRATE IV INFUSION ADD-ON: CPT

## 2022-08-01 PROCEDURE — 80306 DRUG TEST PRSMV INSTRMNT: CPT

## 2022-08-01 PROCEDURE — 82550 ASSAY OF CK (CPK): CPT

## 2022-08-01 PROCEDURE — 85025 COMPLETE CBC W/AUTO DIFF WBC: CPT

## 2022-08-01 PROCEDURE — 99284 EMERGENCY DEPT VISIT MOD MDM: CPT

## 2022-08-01 PROCEDURE — 93005 ELECTROCARDIOGRAM TRACING: CPT

## 2022-08-01 PROCEDURE — 84484 ASSAY OF TROPONIN QUANT: CPT

## 2022-08-01 PROCEDURE — 36415 COLL VENOUS BLD VENIPUNCTURE: CPT

## 2022-08-01 PROCEDURE — 96374 THER/PROPH/DIAG INJ IV PUSH: CPT

## 2022-08-01 PROCEDURE — 83735 ASSAY OF MAGNESIUM: CPT

## 2022-08-01 PROCEDURE — 80053 COMPREHEN METABOLIC PANEL: CPT

## 2022-08-01 PROCEDURE — 83605 ASSAY OF LACTIC ACID: CPT

## 2022-08-01 NOTE — ED
Overdose HPI





- General


Stated Complaint: Overdose


Time Seen by Provider: 08/01/22 17:25


Source: patient, EMS, RN notes reviewed


Mode of arrival: EMS





- History of Present Illness


Initial Comments: 





37-year-old male with a history of bipolar disorder who states he has not used 

drugs deformity fell he was using some cocaine and became unconscious family 

brought him into a shower to try and stimulate him but he became apneic and EMS 

was called.  Unclear exactly how long he was apneic for he was given Narcan and 

later Zofran started being nauseated.  He did become awake alert though somewhat

confused.  Vital signs are stable brought in for evaluation.  He does complain 

of generally just not feeling well he denies any suicidal thought or ideation he

again times using cocaine this unclear what was actually in the material that he

snorted.


MD Complaint: accidental overdose





- Related Data


                                Home Medications











 Medication  Instructions  Recorded  Confirmed


 


No Known Home Medications  08/01/22 08/01/22











                                    Allergies











Allergy/AdvReac Type Severity Reaction Status Date / Time


 


No Known Allergies Allergy   Verified 08/01/22 19:35














Review of Systems


ROS Statement: 


Those systems with pertinent positive or pertinent negative responses have been 

documented in the HPI.





ROS Other: All systems not noted in ROS Statement are negative.





Past Medical History


Past Medical History: Asthma


Additional Past Medical History / Comment(s): Gastritis


History of Any Multi-Drug Resistant Organisms: None Reported


Past Surgical History: No Surgical Hx Reported


Past Psychological History: ADD/ADHD


Past Alcohol Use History: Occasional


Past Drug Use History: Marijuana





General Exam





- General Exam Comments


Initial Comments: 





This a well up well-nourished awake alert oriented 4 male who is anxious


General appearance: alert, anxious


Head exam: Present: atraumatic, normocephalic, normal inspection


Eye exam: Present: normal appearance, PERRL, EOMI.  Absent: scleral icterus, 

conjunctival injection, periorbital swelling


ENT exam: Present: normal exam, mucous membranes moist


Neck exam: Present: normal inspection, full ROM, other (Surgery or bruits).  

Absent: tenderness, meningismus, lymphadenopathy


Respiratory exam: Present: normal lung sounds bilaterally.  Absent: respiratory 

distress, wheezes, rales, rhonchi, stridor


Cardiovascular Exam: Present: regular rate, normal rhythm, normal heart sounds. 

Absent: systolic murmur, diastolic murmur, rubs, gallop, clicks


GI/Abdominal exam: Present: soft, normal bowel sounds.  Absent: distended, 

tenderness, guarding, rebound, rigid


Extremities exam: Present: normal inspection, full ROM, normal capillary refill.

 Absent: tenderness, pedal edema, joint swelling, calf tenderness


Back exam: Present: normal inspection


Neurological exam: Present: alert, oriented X3, CN II-XII intact


Psychiatric exam: Present: normal affect, anxious


Skin exam: Present: warm, intact, normal color, other (Patient's wet (to be from

a shower that he was in per paramedics.).  Absent: rash





Course


                                   Vital Signs











  08/01/22 08/01/22 08/01/22





  17:28 18:45 19:10


 


Temperature 98.5 F  


 


Pulse Rate 82 68 


 


Respiratory 18 16 4 L





Rate   


 


Blood Pressure 122/87 149/88 


 


O2 Sat by Pulse 99 99 





Oximetry   














  08/01/22 08/01/22 08/01/22





  19:15 19:54 20:00


 


Temperature   


 


Pulse Rate 78 65 82


 


Respiratory 14 16 16





Rate   


 


Blood Pressure 134/94 139/105 151/85


 


O2 Sat by Pulse 99 99 100





Oximetry   














  08/01/22





  21:27


 


Temperature 


 


Pulse Rate 78


 


Respiratory 16





Rate 


 


Blood Pressure 146/88


 


O2 Sat by Pulse 99





Oximetry 














Medical Decision Making





- Medical Decision Making





I did reevaluate the patient multiple occasions he is awake alert oriented 4 he

was nauseated he would like to go home he will be discharged the observation 

period was adequate post Narcan.





- Lab Data


Result diagrams: 


                                 08/01/22 17:46





                                 08/01/22 17:46


                                   Lab Results











  08/01/22 08/01/22 08/01/22 Range/Units





  17:46 17:46 17:46 


 


WBC  7.7    (3.8-10.6)  k/uL


 


RBC  4.73    (4.30-5.90)  m/uL


 


Hgb  14.9    (13.0-17.5)  gm/dL


 


Hct  44.8    (39.0-53.0)  %


 


MCV  94.8    (80.0-100.0)  fL


 


MCH  31.6    (25.0-35.0)  pg


 


MCHC  33.3    (31.0-37.0)  g/dL


 


RDW  13.5    (11.5-15.5)  %


 


Plt Count  282    (150-450)  k/uL


 


MPV  7.0    


 


Neutrophils %  48    %


 


Lymphocytes %  40    %


 


Monocytes %  4    %


 


Eosinophils %  4    %


 


Basophils %  1    %


 


Neutrophils #  3.6    (1.3-7.7)  k/uL


 


Lymphocytes #  3.1    (1.0-4.8)  k/uL


 


Monocytes #  0.3    (0-1.0)  k/uL


 


Eosinophils #  0.3    (0-0.7)  k/uL


 


Basophils #  0.1    (0-0.2)  k/uL


 


Sodium   137   (137-145)  mmol/L


 


Potassium   3.7   (3.5-5.1)  mmol/L


 


Chloride   106   ()  mmol/L


 


Carbon Dioxide   24   (22-30)  mmol/L


 


Anion Gap   7   mmol/L


 


BUN   11   (9-20)  mg/dL


 


Creatinine   0.91   (0.66-1.25)  mg/dL


 


Est GFR (CKD-EPI)AfAm   >90   (>60 ml/min/1.73 sqM)  


 


Est GFR (CKD-EPI)NonAf   >90   (>60 ml/min/1.73 sqM)  


 


Glucose   165 H   (74-99)  mg/dL


 


Plasma Lactic Acid Eduardo    1.3  (0.7-2.0)  mmol/L


 


Calcium   9.1   (8.4-10.2)  mg/dL


 


Magnesium   2.0   (1.6-2.3)  mg/dL


 


Total Bilirubin   0.4   (0.2-1.3)  mg/dL


 


AST   24   (17-59)  U/L


 


ALT   20   (4-49)  U/L


 


Alkaline Phosphatase   76   ()  U/L


 


Creatine Kinase   153   ()  U/L


 


Troponin I     (0.000-0.034)  ng/mL


 


Total Protein   6.6   (6.3-8.2)  g/dL


 


Albumin   4.3   (3.5-5.0)  g/dL














  08/01/22 Range/Units





  17:46 


 


WBC   (3.8-10.6)  k/uL


 


RBC   (4.30-5.90)  m/uL


 


Hgb   (13.0-17.5)  gm/dL


 


Hct   (39.0-53.0)  %


 


MCV   (80.0-100.0)  fL


 


MCH   (25.0-35.0)  pg


 


MCHC   (31.0-37.0)  g/dL


 


RDW   (11.5-15.5)  %


 


Plt Count   (150-450)  k/uL


 


MPV   


 


Neutrophils %   %


 


Lymphocytes %   %


 


Monocytes %   %


 


Eosinophils %   %


 


Basophils %   %


 


Neutrophils #   (1.3-7.7)  k/uL


 


Lymphocytes #   (1.0-4.8)  k/uL


 


Monocytes #   (0-1.0)  k/uL


 


Eosinophils #   (0-0.7)  k/uL


 


Basophils #   (0-0.2)  k/uL


 


Sodium   (137-145)  mmol/L


 


Potassium   (3.5-5.1)  mmol/L


 


Chloride   ()  mmol/L


 


Carbon Dioxide   (22-30)  mmol/L


 


Anion Gap   mmol/L


 


BUN   (9-20)  mg/dL


 


Creatinine   (0.66-1.25)  mg/dL


 


Est GFR (CKD-EPI)AfAm   (>60 ml/min/1.73 sqM)  


 


Est GFR (CKD-EPI)NonAf   (>60 ml/min/1.73 sqM)  


 


Glucose   (74-99)  mg/dL


 


Plasma Lactic Acid Eduardo   (0.7-2.0)  mmol/L


 


Calcium   (8.4-10.2)  mg/dL


 


Magnesium   (1.6-2.3)  mg/dL


 


Total Bilirubin   (0.2-1.3)  mg/dL


 


AST   (17-59)  U/L


 


ALT   (4-49)  U/L


 


Alkaline Phosphatase   ()  U/L


 


Creatine Kinase   ()  U/L


 


Troponin I  <0.012  (0.000-0.034)  ng/mL


 


Total Protein   (6.3-8.2)  g/dL


 


Albumin   (3.5-5.0)  g/dL














- EKG Data


-: EKG Interpreted by Me


EKG shows normal: sinus rhythm


EKG Comments: 





Sinus rhythm a 75.  Interval 162 QRS duration 100 QT since /443 no acute 

ST-T wave changes





Disposition


Clinical Impression: 


 Accidental drug overdose





Disposition: HOME SELF-CARE


Condition: Good


Instructions (If sedation given, give patient instructions):  Adult Overdose 

(ED)


Is patient prescribed a controlled substance at d/c from ED?: No


Referrals: 


None,Stated [Primary Care Provider] - 1-2 days


Decision Date: 08/01/22


Decision Time: 22:15

## 2022-08-31 ENCOUNTER — HOSPITAL ENCOUNTER (EMERGENCY)
Dept: HOSPITAL 47 - EC | Age: 37
Discharge: HOME | End: 2022-08-31
Payer: COMMERCIAL

## 2022-08-31 VITALS
RESPIRATION RATE: 20 BRPM | DIASTOLIC BLOOD PRESSURE: 76 MMHG | HEART RATE: 90 BPM | TEMPERATURE: 98.3 F | SYSTOLIC BLOOD PRESSURE: 126 MMHG

## 2022-08-31 DIAGNOSIS — J45.909: ICD-10-CM

## 2022-08-31 DIAGNOSIS — U07.1: Primary | ICD-10-CM

## 2022-08-31 DIAGNOSIS — F17.200: ICD-10-CM

## 2022-08-31 PROCEDURE — 99284 EMERGENCY DEPT VISIT MOD MDM: CPT

## 2022-08-31 PROCEDURE — 87635 SARS-COV-2 COVID-19 AMP PRB: CPT

## 2022-08-31 NOTE — ED
Headache HPI





- General


Chief Complaint: Headache


Stated Complaint: headache, nausea


Time Seen by Provider: 08/31/22 13:40


Source: patient, RN notes reviewed


Mode of arrival: ambulatory


Limitations: no limitations





- History of Present Illness


Initial Comments: 


37-year-old male presents emergency Department chief complaint of headache, 

congestion, body aches.  Patient states that a fever.  Patient denies any sick 

contacts denies any GI symptoms other than nausea no vomiting no diarrhea or 

constipation denies any shortness breath no chest pain.  Patient states she is 

unvaccinated COVID-19, NO KNOWN DRUG ALLERGIES.








- Related Data


                                  Previous Rx's











 Medication  Instructions  Recorded


 


Nirmatrelvir/Ritonavir [Paxlovid See Rx Instructions .ROUTE 08/31/22





2X150 mg-100 mg ([facility])] .COMPLEX #30 tab 











                                    Allergies











Allergy/AdvReac Type Severity Reaction Status Date / Time


 


No Known Allergies Allergy   Verified 08/31/22 13:08














Review of Systems


ROS Statement: 


Those systems with pertinent positive or pertinent negative responses have been 

documented in the HPI.





ROS Other: All systems not noted in ROS Statement are negative.





Past Medical History


Past Medical History: Asthma


Additional Past Medical History / Comment(s): Gastritis


History of Any Multi-Drug Resistant Organisms: None Reported


Past Surgical History: No Surgical Hx Reported


Past Psychological History: ADD/ADHD


Smoking Status: Current every day smoker


Past Alcohol Use History: Occasional


Past Drug Use History: Marijuana





General Exam


Limitations: no limitations


General appearance: alert, in no apparent distress


Head exam: Present: atraumatic, normocephalic, normal inspection


Eye exam: Present: normal appearance, PERRL, EOMI.  Absent: scleral icterus, 

conjunctival injection, periorbital swelling


ENT exam: Present: normal exam, normal oropharynx, mucous membranes moist


Neck exam: Present: normal inspection, full ROM.  Absent: tenderness, 

meningismus, lymphadenopathy


Respiratory exam: Present: normal lung sounds bilaterally.  Absent: respiratory 

distress, wheezes, rales, rhonchi, stridor


Cardiovascular Exam: Present: regular rate, normal rhythm, normal heart sounds. 

Absent: systolic murmur, diastolic murmur, rubs, gallop, clicks


Skin exam: Present: warm, dry, intact, normal color.  Absent: rash





Course


                                   Vital Signs











  08/31/22





  13:07


 


Temperature 98.3 F


 


Pulse Rate 90


 


Respiratory 20





Rate 


 


Blood Pressure 126/76


 


O2 Sat by Pulse 100





Oximetry 














Medical Decision Making





- Medical Decision Making


Patient's positive covid 19.  Patient discharged in stable condition, return 

parameters were discussed patient agrees to plan AND transferred.





- Lab Data


                                   Lab Results











  08/31/22 Range/Units





  13:13 


 


Coronavirus (PCR)  Detected A  (Not Detectd)  














Disposition


Clinical Impression: 


 COVID-19





Disposition: HOME SELF-CARE


Condition: Stable


Instructions (If sedation given, give patient instructions):  COVID-19 (Coronav

irus Disease 2019) (ED)


Additional Instructions: 


Please return to the Emergency Department if symptoms worsen or any other 

concerns.


Prescriptions: 


Nirmatrelvir/Ritonavir [Paxlovid 2X150 mg-100 mg ([facility])] See Rx Instructions 

.ROUTE .COMPLEX #30 tab


Is patient prescribed a controlled substance at d/c from ED?: No


Referrals: 


None,Stated [Primary Care Provider] - 1-2 days


Time of Disposition: 14:08